# Patient Record
Sex: MALE | Race: WHITE | NOT HISPANIC OR LATINO | Employment: FULL TIME | URBAN - NONMETROPOLITAN AREA
[De-identification: names, ages, dates, MRNs, and addresses within clinical notes are randomized per-mention and may not be internally consistent; named-entity substitution may affect disease eponyms.]

---

## 2022-06-27 ENCOUNTER — OFFICE VISIT (OUTPATIENT)
Dept: INTERNAL MEDICINE | Facility: CLINIC | Age: 37
End: 2022-06-27

## 2022-06-27 VITALS
SYSTOLIC BLOOD PRESSURE: 147 MMHG | DIASTOLIC BLOOD PRESSURE: 88 MMHG | HEIGHT: 74 IN | TEMPERATURE: 98 F | RESPIRATION RATE: 16 BRPM | WEIGHT: 273 LBS | OXYGEN SATURATION: 99 % | BODY MASS INDEX: 35.04 KG/M2 | HEART RATE: 85 BPM

## 2022-06-27 DIAGNOSIS — Z13.1 SCREENING FOR DIABETES MELLITUS (DM): ICD-10-CM

## 2022-06-27 DIAGNOSIS — Z13.29 SCREENING FOR THYROID DISORDER: ICD-10-CM

## 2022-06-27 DIAGNOSIS — R03.0 ELEVATED BLOOD PRESSURE READING WITHOUT DIAGNOSIS OF HYPERTENSION: ICD-10-CM

## 2022-06-27 DIAGNOSIS — Z13.228 SCREENING FOR ENDOCRINE, METABOLIC AND IMMUNITY DISORDER: ICD-10-CM

## 2022-06-27 DIAGNOSIS — Z76.89 ENCOUNTER TO ESTABLISH CARE: ICD-10-CM

## 2022-06-27 DIAGNOSIS — K21.9 GASTROESOPHAGEAL REFLUX DISEASE WITHOUT ESOPHAGITIS: ICD-10-CM

## 2022-06-27 DIAGNOSIS — Z13.220 SCREENING FOR CHOLESTEROL LEVEL: ICD-10-CM

## 2022-06-27 DIAGNOSIS — E66.9 OBESITY (BMI 30.0-34.9): ICD-10-CM

## 2022-06-27 DIAGNOSIS — Z13.29 SCREENING FOR ENDOCRINE, METABOLIC AND IMMUNITY DISORDER: ICD-10-CM

## 2022-06-27 DIAGNOSIS — Z23 NEED FOR DIPHTHERIA-TETANUS-PERTUSSIS (TDAP) VACCINE: ICD-10-CM

## 2022-06-27 DIAGNOSIS — Z11.59 ENCOUNTER FOR HEPATITIS C SCREENING TEST FOR LOW RISK PATIENT: ICD-10-CM

## 2022-06-27 DIAGNOSIS — Z13.0 SCREENING FOR ENDOCRINE, METABOLIC AND IMMUNITY DISORDER: ICD-10-CM

## 2022-06-27 DIAGNOSIS — Z00.00 ANNUAL PHYSICAL EXAM: Primary | ICD-10-CM

## 2022-06-27 DIAGNOSIS — Z13.0 SCREENING FOR DEFICIENCY ANEMIA: ICD-10-CM

## 2022-06-27 PROBLEM — E66.811 OBESITY (BMI 30.0-34.9): Status: ACTIVE | Noted: 2022-06-27

## 2022-06-27 PROCEDURE — 99385 PREV VISIT NEW AGE 18-39: CPT | Performed by: NURSE PRACTITIONER

## 2022-06-27 RX ORDER — FAMOTIDINE 20 MG/1
20 TABLET, FILM COATED ORAL DAILY
Qty: 90 TABLET | Refills: 0 | Status: SHIPPED | OUTPATIENT
Start: 2022-06-27

## 2022-06-27 RX ORDER — AMOXICILLIN AND CLAVULANATE POTASSIUM 875; 125 MG/1; MG/1
1 TABLET, FILM COATED ORAL 2 TIMES DAILY
COMMUNITY
End: 2022-07-11

## 2022-06-27 NOTE — PROGRESS NOTES
Subjective   Anand Lui is a 37 y.o. male and is here for a comprehensive physical exam. The patient reports GERD.    HPI:    Health Habits:  Eye exam within last 2 years? Yes   Dental exam every 6 months? Yes   Exercise habits: 0/7, job includes moving and lifting so he obtains exercise through those means   Healthy diet?  Wife was diagnosed with gestational diabetes.  Patient and wife are doing more portion control, cutting back carbs and sodas  Do you take any herbs or supplements that were not prescribed by a doctor? no  Are you taking aspirin daily? No     History:  Date last PSA: NA  He reports No decrease in urinary stream,  No nocturia, No dribbling, No hesitancy.    Family history of prostate cancer: No  No Family history of colon cancer.      reports being sexually active and has had partner(s) who are female. He reports using the following method of birth control/protection: Condom.    The following portions of the patient's history were reviewed and updated as appropriate: He  has no past medical history on file.  He does not have any pertinent problems on file.  He  has no past surgical history on file.  His family history includes Hyperlipidemia in his father; Hypertension in his father.  He  reports that he has never smoked. He has never used smokeless tobacco. He reports current alcohol use of about 1.0 standard drink of alcohol per week. He reports that he does not use drugs.  Current Outpatient Medications   Medication Sig Dispense Refill   • amoxicillin-clavulanate (Augmentin) 875-125 MG per tablet Take 1 tablet by mouth 2 (Two) Times a Day.     • famotidine (Pepcid) 20 MG tablet Take 1 tablet by mouth Daily. 90 tablet 0     No current facility-administered medications for this visit.       Review of Systems    Review of Systems   Gastrointestinal: Positive for GERD and indigestion. Negative for abdominal pain, nausea and vomiting.   All other systems reviewed and are  "negative.        Objective   /88   Pulse 85   Temp 98 °F (36.7 °C) (Temporal)   Resp 16   Ht 188 cm (74\")   Wt 124 kg (273 lb)   SpO2 99%   BMI 35.05 kg/m²     Physical Exam  Vitals and nursing note reviewed.   Constitutional:       Appearance: Normal appearance. He is obese.   HENT:      Head: Normocephalic and atraumatic.      Right Ear: Tympanic membrane normal.      Left Ear: Tympanic membrane normal.      Nose: Nose normal.      Mouth/Throat:      Mouth: Mucous membranes are moist.      Pharynx: Oropharynx is clear.   Eyes:      Extraocular Movements: Extraocular movements intact.      Conjunctiva/sclera: Conjunctivae normal.      Pupils: Pupils are equal, round, and reactive to light.   Neck:      Thyroid: No thyromegaly.      Vascular: No carotid bruit.   Cardiovascular:      Rate and Rhythm: Normal rate and regular rhythm.      Pulses: Normal pulses.      Heart sounds: Normal heart sounds.   Pulmonary:      Effort: Pulmonary effort is normal.      Breath sounds: Normal breath sounds.   Abdominal:      General: Abdomen is flat. Bowel sounds are normal.      Palpations: Abdomen is soft.   Genitourinary:     Comments: defer  Musculoskeletal:         General: Normal range of motion.      Cervical back: Normal range of motion and neck supple.   Lymphadenopathy:      Cervical: No cervical adenopathy.   Skin:     General: Skin is warm and dry.      Capillary Refill: Capillary refill takes less than 2 seconds.   Neurological:      General: No focal deficit present.      Mental Status: He is alert and oriented to person, place, and time.      Cranial Nerves: No cranial nerve deficit.      Motor: No weakness.      Gait: Gait normal.   Psychiatric:         Mood and Affect: Mood normal.         Behavior: Behavior normal.         Thought Content: Thought content normal.         Health Maintenance   Topic Date Due   • TDAP/TD VACCINES (2 - Tdap) 06/27/2022 (Originally 7/25/2012)   • INFLUENZA VACCINE  " 10/01/2022        Assessment & Plan   Healthy male exam.     1.    Diagnosis Plan   1. Annual physical exam  Tdap Vaccine Greater Than or Equal To 8yo IM    CBC No Differential    Comprehensive Metabolic Panel    Hemoglobin A1c    Lipid Panel    TSH Rfx On Abnormal To Free T4    Hepatitis C antibody   2. Encounter to establish care  Tdap Vaccine Greater Than or Equal To 8yo IM    CBC No Differential    Comprehensive Metabolic Panel    Hemoglobin A1c    Lipid Panel    TSH Rfx On Abnormal To Free T4    Hepatitis C antibody   3. Elevated blood pressure reading without diagnosis of hypertension  First reading was 160/92 with a repeat of 147/88. Improved, but remains elevated/uncontrolled. Recommend DASH diet, 30 minutes of exercise at least 5 days a week or 150 minutes a week, and home blood pressure monitoring.  Keep a log of BP readings and bring it to next visit.  Bring in your blood pressure machine next visit to compare readings to ensure accuracy. F/u 1-2 weeks.    4. Gastroesophageal reflux disease  famotidine (Pepcid) 20 MG tablet  Uncontrolled. Anti-reflux measures, trigger food and drinks to avoid; Fatty foods, alcohol, chocolate, coffee, tea, caffeinated soft drinks (decaffeinated coffee still has some caffeine), peppermint and spearmint, spices and vinegar, citrus fruits and juices, tomatoes and tomato sauces. Other antireflux measures include weight reduction if overweight, avoid tight clothing around the abdomen, elevate the head of the bed 6 inches (may us a bed wedge which is placed between the mattress and box springs) or blocks under the heard of the bed. Don't drink or eat for 2 hours before going to bed and avoid lying down immediately after meals. Take medication as prescribed.    5. Need for diphtheria-tetanus-pertussis (Tdap) vaccine  Tdap Vaccine Greater Than or Equal To 8yo IM  Patient left office before Tdap was administered.  Will administer next visit.   6. Screening for endocrine, metabolic  and immunity disorder  Comprehensive Metabolic Panel   7. Encounter for hepatitis C screening test for low risk patient  Hepatitis C antibody   8. Screening for thyroid disorder  TSH Rfx On Abnormal To Free T4   9. Screening for diabetes mellitus (DM)  Hemoglobin A1c   10. Screening for deficiency anemia  CBC No Differential   11.    12.   Screening for cholesterol level     Obesity (BMI 30.0-34.9) Lipid Panel    Comprehensive Metabolic Panel   Hemoglobin A1c   Lipid Panel   TSH Rfx On Abnormal To Free T4   Class 2 Severe Obesity (BMI >=35 and <=39.9). Obesity-related health conditions include the following: obstructive sleep apnea, hypertension, diabetes mellitus, dyslipidemias and GERD. Obesity is newly identified. BMI is is above average; BMI management plan is completed. We discussed portion control and increasing exercise.       2. Patient Counseling:  -Health maintenance information provided and discussed  -Counseled on age-appropriate health screenings  -Immunizations for age discussed, encouraged.  -Encouraged 30 minutes of exercise 5 days a week, or 150 minutes total per week.  -Recommend healthy diet choices and portion control   -Discussed importance of regular dental visits and cleanings every 6 months.  -Discussed importance of regular eye exams    3. Discussed the patient's BMI with him.  The BMI is above average; BMI management plan is completed  4. Return in about 2 weeks (around 7/11/2022) for elevated bp, heartburn .         Snow Garcia, APRN  06/27/2022  17:10 EDT

## 2022-07-02 ENCOUNTER — LAB (OUTPATIENT)
Dept: LAB | Facility: HOSPITAL | Age: 37
End: 2022-07-02

## 2022-07-02 LAB
ALBUMIN SERPL-MCNC: 4.4 G/DL (ref 3.5–5.2)
ALBUMIN/GLOB SERPL: 1.8 G/DL
ALP SERPL-CCNC: 60 U/L (ref 39–117)
ALT SERPL W P-5'-P-CCNC: 34 U/L (ref 1–41)
ANION GAP SERPL CALCULATED.3IONS-SCNC: 14 MMOL/L (ref 5–15)
AST SERPL-CCNC: 20 U/L (ref 1–40)
BILIRUB SERPL-MCNC: 0.6 MG/DL (ref 0–1.2)
BUN SERPL-MCNC: 12 MG/DL (ref 6–20)
BUN/CREAT SERPL: 14.1 (ref 7–25)
CALCIUM SPEC-SCNC: 9.2 MG/DL (ref 8.6–10.5)
CHLORIDE SERPL-SCNC: 104 MMOL/L (ref 98–107)
CHOLEST SERPL-MCNC: 159 MG/DL (ref 0–200)
CO2 SERPL-SCNC: 23 MMOL/L (ref 22–29)
CREAT SERPL-MCNC: 0.85 MG/DL (ref 0.76–1.27)
DEPRECATED RDW RBC AUTO: 39.9 FL (ref 37–54)
EGFRCR SERPLBLD CKD-EPI 2021: 116.2 ML/MIN/1.73
ERYTHROCYTE [DISTWIDTH] IN BLOOD BY AUTOMATED COUNT: 12.8 % (ref 12.3–15.4)
GLOBULIN UR ELPH-MCNC: 2.5 GM/DL
GLUCOSE SERPL-MCNC: 96 MG/DL (ref 65–99)
HBA1C MFR BLD: 5.5 % (ref 4.8–5.6)
HCT VFR BLD AUTO: 44.7 % (ref 37.5–51)
HCV AB SER DONR QL: NORMAL
HDLC SERPL-MCNC: 43 MG/DL (ref 40–60)
HGB BLD-MCNC: 15 G/DL (ref 13–17.7)
LDLC SERPL CALC-MCNC: 91 MG/DL (ref 0–100)
LDLC/HDLC SERPL: 2.02 {RATIO}
MCH RBC QN AUTO: 29.2 PG (ref 26.6–33)
MCHC RBC AUTO-ENTMCNC: 33.6 G/DL (ref 31.5–35.7)
MCV RBC AUTO: 87.1 FL (ref 79–97)
PLATELET # BLD AUTO: 189 10*3/MM3 (ref 140–450)
PMV BLD AUTO: 12.2 FL (ref 6–12)
POTASSIUM SERPL-SCNC: 4.4 MMOL/L (ref 3.5–5.2)
PROT SERPL-MCNC: 6.9 G/DL (ref 6–8.5)
RBC # BLD AUTO: 5.13 10*6/MM3 (ref 4.14–5.8)
SODIUM SERPL-SCNC: 141 MMOL/L (ref 136–145)
TRIGL SERPL-MCNC: 145 MG/DL (ref 0–150)
TSH SERPL DL<=0.05 MIU/L-ACNC: 2.43 UIU/ML (ref 0.27–4.2)
VLDLC SERPL-MCNC: 25 MG/DL (ref 5–40)
WBC NRBC COR # BLD: 8.92 10*3/MM3 (ref 3.4–10.8)

## 2022-07-02 PROCEDURE — 36415 COLL VENOUS BLD VENIPUNCTURE: CPT | Performed by: NURSE PRACTITIONER

## 2022-07-02 PROCEDURE — 83036 HEMOGLOBIN GLYCOSYLATED A1C: CPT | Performed by: NURSE PRACTITIONER

## 2022-07-02 PROCEDURE — 80061 LIPID PANEL: CPT | Performed by: NURSE PRACTITIONER

## 2022-07-02 PROCEDURE — 86803 HEPATITIS C AB TEST: CPT | Performed by: NURSE PRACTITIONER

## 2022-07-02 PROCEDURE — 80050 GENERAL HEALTH PANEL: CPT | Performed by: NURSE PRACTITIONER

## 2022-07-11 ENCOUNTER — OFFICE VISIT (OUTPATIENT)
Dept: INTERNAL MEDICINE | Facility: CLINIC | Age: 37
End: 2022-07-11

## 2022-07-11 VITALS
SYSTOLIC BLOOD PRESSURE: 160 MMHG | WEIGHT: 274 LBS | HEART RATE: 69 BPM | TEMPERATURE: 96.8 F | OXYGEN SATURATION: 99 % | HEIGHT: 74 IN | DIASTOLIC BLOOD PRESSURE: 90 MMHG | BODY MASS INDEX: 35.16 KG/M2

## 2022-07-11 DIAGNOSIS — I10 PRIMARY HYPERTENSION: Primary | ICD-10-CM

## 2022-07-11 DIAGNOSIS — S39.012A STRAIN OF LUMBAR REGION, INITIAL ENCOUNTER: ICD-10-CM

## 2022-07-11 PROCEDURE — 99214 OFFICE O/P EST MOD 30 MIN: CPT | Performed by: NURSE PRACTITIONER

## 2022-07-11 RX ORDER — LISINOPRIL 10 MG/1
10 TABLET ORAL DAILY
Qty: 30 TABLET | Refills: 0 | Status: SHIPPED | OUTPATIENT
Start: 2022-07-11 | End: 2022-08-09 | Stop reason: SDUPTHER

## 2022-07-11 RX ORDER — CYCLOBENZAPRINE HCL 10 MG
10 TABLET ORAL 3 TIMES DAILY PRN
Qty: 21 TABLET | Refills: 0 | Status: SHIPPED | OUTPATIENT
Start: 2022-07-11 | End: 2022-09-06

## 2022-07-11 NOTE — PROGRESS NOTES
"  Office Visit      Patient Name: Anand Lui  : 1985   MRN: [id number]   Care Team: Patient Care Team:  Snow Garcia APRN as PCP - General (Family Medicine)    Chief Complaint  Follow-up (Lower back pain since last week. )    Subjective     Subjective      Anand Lui presents to [facility] for follow-up of hypertension and new onset back pain  Home blood pressure readings averaging ~148/88 daily consistent.  Denies chest pain, shortness of breath, headache, dizziness, and excess fatigue.   Brought machine in so we can check accuracy.  Trying to follow DASH diet and has been working on exercising.    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  Complains the left lower side of his back hurts when he stretches it out and with movement x 1 week.  States it is very mild and he would not have came for the doctor for this, but since he was here he thought he would mention it because he is unsure what medications he can take with high blood pressure.  He has not been taking anything for the pain, not been trying any heat or stretches.  States it is not getting worse, but not getting any better.  Pain does not refer, and is a 4 out of 10 currently.    Review of Systems   Constitutional: Negative.    Eyes: Negative.    Respiratory: Negative.    Cardiovascular: Negative.    Gastrointestinal: Negative.    Musculoskeletal: Positive for back pain and myalgias.   Neurological: Negative.    All other systems reviewed and are negative.      Objective     Objective   Vital Signs:   /90   Pulse 69   Temp 96.8 °F (36 °C)   Ht 188 cm (74\")   Wt 124 kg (274 lb)   SpO2 99%   BMI 35.18 kg/m²     Physical Exam  Vitals and nursing note reviewed.   Constitutional:       General: He is not in acute distress.     Appearance: Normal appearance. He is obese. He is not ill-appearing, toxic-appearing or diaphoretic. "   HENT:      Head: Normocephalic and atraumatic.   Eyes:      Extraocular Movements: Extraocular movements intact.      Pupils: Pupils are equal, round, and reactive to light.   Neck:      Vascular: No carotid bruit or JVD.   Cardiovascular:      Rate and Rhythm: Normal rate and regular rhythm.   Pulmonary:      Effort: Pulmonary effort is normal.      Breath sounds: Normal breath sounds.   Musculoskeletal:         General: Normal range of motion.      Cervical back: Normal range of motion and neck supple.      Lumbar back: Spasms and tenderness present. No swelling or deformity. Normal range of motion. Negative right straight leg raise test and negative left straight leg raise test.      Right lower leg: No edema.      Left lower leg: No edema.   Skin:     General: Skin is warm and dry.   Neurological:      General: No focal deficit present.      Mental Status: He is alert and oriented to person, place, and time.   Psychiatric:         Mood and Affect: Mood normal.         Behavior: Behavior normal.         Thought Content: Thought content normal.         Judgment: Judgment normal.          Assessment / Plan      Assessment & Plan   Problem List Items Addressed This Visit    None     Visit Diagnoses     Primary hypertension    -  Primary    Relevant Medications    Uncontrolled. Star lisinopril (PRINIVIL,ZESTRIL) 10 MG tablet daily. Discussed side effects. Recommend DASH diet, moderate-intensity exercises 4-5 times/week, medication compliance, and home blood pressure monitoring. Goal is <130/80. F/u 4 weeks or sooner for recheck. BP machine checked and accurate compare to manual.     Strain of lumbar region, initial encounter        Relevant Medications    cyclobenzaprine (FLEXERIL) 10 MG tablet  Patient was counseled regarding the use of applying heat for 15-20 minute intervals, OTC creams for pain relief, and the benefits of Physical therapy and exercise/stretches. Discussed proper methods of bending, lifting with  the knees. Patient was prescribed muscle relaxer for short term management of symptoms. Try to avoid NSAIDs because this can elevate blood pressure.  Recommend conservative measures and Tylenol as needed.  Follow up if symptoms persist/worsen.        Labs from previous visit reviewed with patient. Answered any and all questions patient had.        Follow Up   Return in about 4 weeks (around 8/8/2022) for BP/Back pain.  Patient was given instructions and counseling regarding his condition or for health maintenance advice. Please see specific information pulled into the AVS if appropriate.     IRINEO Ram  [facility]

## 2022-08-09 ENCOUNTER — OFFICE VISIT (OUTPATIENT)
Dept: INTERNAL MEDICINE | Facility: CLINIC | Age: 37
End: 2022-08-09

## 2022-08-09 DIAGNOSIS — S39.012D STRAIN OF LUMBAR REGION, SUBSEQUENT ENCOUNTER: ICD-10-CM

## 2022-08-09 DIAGNOSIS — I10 PRIMARY HYPERTENSION: Primary | ICD-10-CM

## 2022-08-09 PROCEDURE — 99214 OFFICE O/P EST MOD 30 MIN: CPT | Performed by: NURSE PRACTITIONER

## 2022-08-09 RX ORDER — LISINOPRIL 10 MG/1
10 TABLET ORAL DAILY
Qty: 90 TABLET | Refills: 1 | Status: SHIPPED | OUTPATIENT
Start: 2022-08-09 | End: 2022-08-09

## 2022-08-09 RX ORDER — LISINOPRIL 10 MG/1
10 TABLET ORAL DAILY
Qty: 90 TABLET | Refills: 1 | Status: SHIPPED | OUTPATIENT
Start: 2022-08-09 | End: 2023-02-06

## 2022-08-09 RX ORDER — AMOXICILLIN AND CLAVULANATE POTASSIUM 875; 125 MG/1; MG/1
TABLET, FILM COATED ORAL
COMMUNITY
Start: 2022-07-19 | End: 2022-08-09

## 2022-08-10 VITALS
SYSTOLIC BLOOD PRESSURE: 148 MMHG | HEIGHT: 74 IN | TEMPERATURE: 97.3 F | RESPIRATION RATE: 16 BRPM | OXYGEN SATURATION: 99 % | HEART RATE: 72 BPM | BODY MASS INDEX: 34.78 KG/M2 | WEIGHT: 271 LBS | DIASTOLIC BLOOD PRESSURE: 88 MMHG

## 2022-08-10 NOTE — PROGRESS NOTES
"  Office Visit      Patient Name: Anand Lui  : 1985   MRN: [id number]   Care Team: Patient Care Team:  Snow Garcia APRN as PCP - General (Family Medicine)    Chief Complaint  Hypertension (4 week follow up, pt states that he misplaced his medication while he had Covid, requesting refill)    Subjective     Subjective      Anand Lui presents to [facility] for follow-up of hypertension.   Started on Lisinopril 10 mg last visit. He was taking the medication as prescribed x 1 week and states he lost the bottle. He has not been taking it for 3 weeks. When the medication was initiated and during that first week, his BP was averaging 120/70s with home readings. He ended up getting diagnosed with COVID-19 and with the stress of this he forgot his medication and lost it.  Denies chest pain, shortness of breath, headache, dizziness, and excess fatigue.   Trying to follow DASH diet and being active.    Reports back pain is much improved from last visit.     Review of Systems   Constitutional: Negative.    Eyes: Negative.    Respiratory: Negative.    Cardiovascular: Negative.    Gastrointestinal: Negative.    Musculoskeletal: Negative for back pain.   Neurological: Negative.    All other systems reviewed and are negative.      Objective     Objective   Vital Signs:   /88   Pulse 72   Temp 97.3 °F (36.3 °C) (Temporal)   Resp 16   Ht 188 cm (74\")   Wt 123 kg (271 lb)   SpO2 99%   BMI 34.79 kg/m²     Physical Exam  Vitals and nursing note reviewed.   Constitutional:       Appearance: Normal appearance. He is obese.   HENT:      Head: Normocephalic and atraumatic.   Eyes:      Extraocular Movements: Extraocular movements intact.      Pupils: Pupils are equal, round, and reactive to light.   Neck:      Thyroid: No thyromegaly.   Cardiovascular:      Rate and Rhythm: Normal rate and regular rhythm.      Heart sounds: Normal heart sounds.   Pulmonary:      Effort: " Pulmonary effort is normal.      Breath sounds: Normal breath sounds.   Musculoskeletal:         General: Normal range of motion.      Cervical back: Normal range of motion and neck supple.   Neurological:      General: No focal deficit present.      Mental Status: He is alert and oriented to person, place, and time.   Psychiatric:         Mood and Affect: Mood normal.         Behavior: Behavior normal.         Thought Content: Thought content normal.         Judgment: Judgment normal.          Assessment / Plan      Assessment & Plan   Problem List Items Addressed This Visit    None     Visit Diagnoses     Primary hypertension    -  Primary    Relevant Medications    lisinopril (PRINIVIL,ZESTRIL) 10 MG tablet    Strain of lumbar region, subsequent encounter                Refilled Lisinopril 10 mg. Discussed medication compliance and importance of this. Recommend DASH diet, moderate-intensity exercises 4-5 times/week,, and home blood pressure monitoring. Home BP monitor was checked last visit and accurate. F/u in additional 2-4 weeks with BP readings so we can ensure more normalized levels. Goal is <130/80.   Strain of lumbar region is now resolved. Only had to take 1 dose of the muscle relaxer. No issues.         Follow Up   Return in about 4 weeks (around 9/6/2022) for bp.  Patient was given instructions and counseling regarding his condition or for health maintenance advice. Please see specific information pulled into the AVS if appropriate.     IRINEO Ram  [facility] - [place]

## 2022-09-06 ENCOUNTER — OFFICE VISIT (OUTPATIENT)
Dept: INTERNAL MEDICINE | Facility: CLINIC | Age: 37
End: 2022-09-06

## 2022-09-06 VITALS
BODY MASS INDEX: 34.52 KG/M2 | SYSTOLIC BLOOD PRESSURE: 122 MMHG | RESPIRATION RATE: 16 BRPM | OXYGEN SATURATION: 97 % | HEIGHT: 74 IN | DIASTOLIC BLOOD PRESSURE: 70 MMHG | WEIGHT: 269 LBS | HEART RATE: 83 BPM | TEMPERATURE: 98 F

## 2022-09-06 DIAGNOSIS — I10 PRIMARY HYPERTENSION: Primary | ICD-10-CM

## 2022-09-06 PROCEDURE — 99214 OFFICE O/P EST MOD 30 MIN: CPT | Performed by: NURSE PRACTITIONER

## 2022-09-06 RX ORDER — IBUPROFEN 800 MG/1
TABLET ORAL
COMMUNITY
Start: 2022-08-22

## 2022-09-06 RX ORDER — HYDROCODONE BITARTRATE AND ACETAMINOPHEN 5; 325 MG/1; MG/1
1 TABLET ORAL EVERY 6 HOURS PRN
COMMUNITY
Start: 2022-09-01

## 2022-09-06 RX ORDER — AMOXICILLIN AND CLAVULANATE POTASSIUM 875; 125 MG/1; MG/1
TABLET, FILM COATED ORAL
COMMUNITY
Start: 2022-09-01 | End: 2023-03-13

## 2022-09-06 NOTE — PROGRESS NOTES
"  Office Visit      Patient Name: Anand Lui  : 1985   MRN: [id number]   Care Team: Patient Care Team:  Snow Braxton APRN as PCP - General (Family Medicine)    Chief Complaint  Hypertension (Follow up)    Subjective     Subjective      Anand Lui presents to [facility] for follow-up of hypertension.   Taking Lisinopril 10 mg daily. He is taking the medication as prescribed and denies any adverse effects.   Denies chest pain, shortness of breath, headache, dizziness, and excess fatigue.   Trying to follow DASH diet and has been exercising.    Review of Systems   Constitutional: Negative for chills, fatigue and fever.   Eyes: Negative for visual disturbance.   Respiratory: Negative for chest tightness and shortness of breath.    Cardiovascular: Negative for chest pain, palpitations and leg swelling.   Gastrointestinal: Negative for nausea and vomiting.   Musculoskeletal: Negative for myalgias.   Neurological: Negative for dizziness, syncope, light-headedness and headache.       Objective     Objective   Vital Signs:   /70   Pulse 83   Temp 98 °F (36.7 °C) (Temporal)   Resp 16   Ht 188 cm (74\")   Wt 122 kg (269 lb)   SpO2 97%   BMI 34.54 kg/m²     Physical Exam  Vitals and nursing note reviewed.   Constitutional:       General: He is not in acute distress.     Appearance: Normal appearance. He is obese. He is not diaphoretic.   HENT:      Head: Normocephalic and atraumatic.   Eyes:      Extraocular Movements: Extraocular movements intact.      Pupils: Pupils are equal, round, and reactive to light.   Neck:      Vascular: No JVD.   Cardiovascular:      Rate and Rhythm: Normal rate and regular rhythm.   Pulmonary:      Effort: Pulmonary effort is normal.      Breath sounds: Normal breath sounds.   Musculoskeletal:         General: Normal range of motion.      Cervical back: Normal range of motion and neck supple.      Right lower leg: No edema.      Left " lower leg: No edema.   Skin:     General: Skin is warm and dry.      Capillary Refill: Capillary refill takes less than 2 seconds.   Neurological:      General: No focal deficit present.      Mental Status: He is alert and oriented to person, place, and time.   Psychiatric:         Mood and Affect: Mood normal.         Behavior: Behavior normal.         Thought Content: Thought content normal.          Assessment / Plan      Assessment & Plan   Problem List Items Addressed This Visit    None     Visit Diagnoses     Primary hypertension    -  Primary        Stable.   Continue current medications as prescribed.  Recommend DASH diet, moderate-intensity exercises 4-5 times/week, medication compliance, and home blood pressure monitoring.       Follow Up   Return in about 6 months (around 3/6/2023).  Patient was given instructions and counseling regarding his condition or for health maintenance advice. Please see specific information pulled into the AVS if appropriate.     IRINEO Krueger  [facility] - [place]

## 2023-02-05 DIAGNOSIS — I10 PRIMARY HYPERTENSION: ICD-10-CM

## 2023-02-06 RX ORDER — LISINOPRIL 10 MG/1
TABLET ORAL
Qty: 90 TABLET | Refills: 1 | Status: SHIPPED | OUTPATIENT
Start: 2023-02-06

## 2023-02-06 NOTE — TELEPHONE ENCOUNTER
Rx Refill Note  Requested Prescriptions     Pending Prescriptions Disp Refills   • lisinopril (PRINIVIL,ZESTRIL) 10 MG tablet [Pharmacy Med Name: LISINOPRIL 10 MG TABLET] 90 tablet 1     Sig: TAKE 1 TABLET BY MOUTH EVERY DAY      Last office visit with prescribing clinician: 9/6/2022   Last telemedicine visit with prescribing clinician:   Next office visit with prescribing clinician: 3/6/2023       Ida Gonzalez MA  02/06/23, 14:23 EST

## 2023-03-13 ENCOUNTER — OFFICE VISIT (OUTPATIENT)
Dept: INTERNAL MEDICINE | Facility: CLINIC | Age: 38
End: 2023-03-13
Payer: COMMERCIAL

## 2023-03-13 VITALS
DIASTOLIC BLOOD PRESSURE: 80 MMHG | SYSTOLIC BLOOD PRESSURE: 130 MMHG | TEMPERATURE: 98.1 F | HEIGHT: 74 IN | WEIGHT: 276.75 LBS | BODY MASS INDEX: 35.52 KG/M2 | HEART RATE: 68 BPM | OXYGEN SATURATION: 98 %

## 2023-03-13 DIAGNOSIS — K21.9 GASTROESOPHAGEAL REFLUX DISEASE WITHOUT ESOPHAGITIS: ICD-10-CM

## 2023-03-13 DIAGNOSIS — I10 PRIMARY HYPERTENSION: Primary | ICD-10-CM

## 2023-03-13 PROCEDURE — 96127 BRIEF EMOTIONAL/BEHAV ASSMT: CPT | Performed by: NURSE PRACTITIONER

## 2023-03-13 PROCEDURE — 99214 OFFICE O/P EST MOD 30 MIN: CPT | Performed by: NURSE PRACTITIONER

## 2023-03-13 RX ORDER — BENZONATATE 100 MG/1
CAPSULE ORAL
COMMUNITY
Start: 2022-12-12

## 2023-03-13 NOTE — PROGRESS NOTES
"  Office Visit      Patient Name: Anand Lui  : 1985   MRN: [id number]   Care Team: Patient Care Team:  Snow Braxton APRN as PCP - General (Family Medicine)    Chief Complaint  Follow-up (6 month follow up, HTN/)    Subjective     Subjective      Anand Lui presents to [facility] for follow-up of hypertension.   Taking lisinopril 10 mg as directed.  No adverse effects.  Not monitoring his blood pressure at home since last readings were normal at last visit in September  Denies chest pain, shortness of breath, headache, dizziness, and excess fatigue.   Trying to follow DASH diet and staying active    Reflux is stable on the famotidine as long as he watches his diet.    PHQ-9: Brief Depression Severity Measure Score: 0  Denies depression symptoms.       Objective     Objective   Vital Signs:   /80 (BP Location: Left arm, Patient Position: Sitting, Cuff Size: Adult)   Pulse 68   Temp 98.1 °F (36.7 °C) (Temporal)   Ht 188 cm (74\")   Wt 126 kg (276 lb 12 oz)   SpO2 98%   BMI 35.53 kg/m²     Physical Exam  Vitals and nursing note reviewed.   Constitutional:       General: He is not in acute distress.     Appearance: Normal appearance. He is obese. He is not diaphoretic.   HENT:      Head: Normocephalic and atraumatic.      Right Ear: External ear normal.      Left Ear: External ear normal.      Nose: Nose normal.      Mouth/Throat:      Mouth: Mucous membranes are moist.   Eyes:      Extraocular Movements: Extraocular movements intact.      Pupils: Pupils are equal, round, and reactive to light.   Neck:      Vascular: No JVD.   Cardiovascular:      Rate and Rhythm: Normal rate and regular rhythm.      Heart sounds: Normal heart sounds.   Pulmonary:      Effort: Pulmonary effort is normal.      Breath sounds: Normal breath sounds.   Musculoskeletal:         General: Normal range of motion.      Cervical back: Normal range of motion and neck supple.      Right " lower leg: No edema.      Left lower leg: No edema.   Skin:     General: Skin is warm and dry.      Capillary Refill: Capillary refill takes less than 2 seconds.   Neurological:      Mental Status: He is alert and oriented to person, place, and time.   Psychiatric:         Mood and Affect: Mood normal.         Behavior: Behavior normal.         Thought Content: Thought content normal.         Judgment: Judgment normal.          Assessment / Plan      Assessment & Plan   Problem List Items Addressed This Visit        Gastrointestinal Abdominal     Gastroesophageal reflux disease   Other Visit Diagnoses     Primary hypertension    -  Primary    Relevant Orders    CBC No Differential    Comprehensive Metabolic Panel        HTN stable. Continue current medications as prescribed. Recommend DASH diet, 150 minutes of exercise weekly, medication compliance, and home blood pressure monitoring.  Update CBC/CMP today.  Previous labs reviewed and no other concerns, lipid panel was normal.  GERD stable.  Continue famotidine, reflux measures and trigger foods discussed.      Follow Up   Return in about 3 months (around 6/27/2023) for Annual.  Patient was given instructions and counseling regarding his condition or for health maintenance advice. Please see specific information pulled into the AVS if appropriate.     IRINEO Krueger  [facility]

## 2024-03-25 ENCOUNTER — OFFICE VISIT (OUTPATIENT)
Dept: INTERNAL MEDICINE | Facility: CLINIC | Age: 39
End: 2024-03-25
Payer: COMMERCIAL

## 2024-03-25 VITALS
HEART RATE: 71 BPM | BODY MASS INDEX: 36.08 KG/M2 | TEMPERATURE: 98 F | DIASTOLIC BLOOD PRESSURE: 90 MMHG | OXYGEN SATURATION: 98 % | SYSTOLIC BLOOD PRESSURE: 146 MMHG | WEIGHT: 281 LBS

## 2024-03-25 DIAGNOSIS — R79.89 ELEVATED TSH: Primary | ICD-10-CM

## 2024-03-25 DIAGNOSIS — K08.89 PAIN, DENTAL: ICD-10-CM

## 2024-03-25 DIAGNOSIS — R05.1 ACUTE COUGH: ICD-10-CM

## 2024-03-25 DIAGNOSIS — I10 PRIMARY HYPERTENSION: Primary | ICD-10-CM

## 2024-03-25 LAB
ALBUMIN SERPL-MCNC: 4.4 G/DL (ref 3.5–5.2)
ALBUMIN/GLOB SERPL: 2 G/DL
ALP SERPL-CCNC: 66 U/L (ref 39–117)
ALT SERPL-CCNC: 45 U/L (ref 1–41)
AST SERPL-CCNC: 21 U/L (ref 1–40)
BILIRUB SERPL-MCNC: 0.4 MG/DL (ref 0–1.2)
BUN SERPL-MCNC: 13 MG/DL (ref 6–20)
BUN/CREAT SERPL: 10.3 (ref 7–25)
CALCIUM SERPL-MCNC: 9.1 MG/DL (ref 8.6–10.5)
CHLORIDE SERPL-SCNC: 103 MMOL/L (ref 98–107)
CHOLEST SERPL-MCNC: 135 MG/DL (ref 0–200)
CO2 SERPL-SCNC: 29.1 MMOL/L (ref 22–29)
CREAT SERPL-MCNC: 1.26 MG/DL (ref 0.76–1.27)
EGFRCR SERPLBLD CKD-EPI 2021: 75.8 ML/MIN/1.73
ERYTHROCYTE [DISTWIDTH] IN BLOOD BY AUTOMATED COUNT: 13 % (ref 12.3–15.4)
GLOBULIN SER CALC-MCNC: 2.2 GM/DL
GLUCOSE SERPL-MCNC: 76 MG/DL (ref 65–99)
HCT VFR BLD AUTO: 43.8 % (ref 37.5–51)
HDLC SERPL-MCNC: 54 MG/DL (ref 40–60)
HGB BLD-MCNC: 14.6 G/DL (ref 13–17.7)
LDLC SERPL CALC-MCNC: 54 MG/DL (ref 0–100)
MCH RBC QN AUTO: 29.3 PG (ref 26.6–33)
MCHC RBC AUTO-ENTMCNC: 33.3 G/DL (ref 31.5–35.7)
MCV RBC AUTO: 87.8 FL (ref 79–97)
PLATELET # BLD AUTO: 199 10*3/MM3 (ref 140–450)
POTASSIUM SERPL-SCNC: 4.7 MMOL/L (ref 3.5–5.2)
PROT SERPL-MCNC: 6.6 G/DL (ref 6–8.5)
RBC # BLD AUTO: 4.99 10*6/MM3 (ref 4.14–5.8)
SODIUM SERPL-SCNC: 142 MMOL/L (ref 136–145)
T4 FREE SERPL-MCNC: 1.19 NG/DL (ref 0.93–1.7)
TRIGL SERPL-MCNC: 163 MG/DL (ref 0–150)
TSH SERPL DL<=0.005 MIU/L-ACNC: 5.47 UIU/ML (ref 0.27–4.2)
VLDLC SERPL CALC-MCNC: 27 MG/DL (ref 5–40)
WBC # BLD AUTO: 12.47 10*3/MM3 (ref 3.4–10.8)

## 2024-03-25 PROCEDURE — 99214 OFFICE O/P EST MOD 30 MIN: CPT | Performed by: NURSE PRACTITIONER

## 2024-03-25 RX ORDER — LISINOPRIL 10 MG/1
10 TABLET ORAL DAILY
Qty: 90 TABLET | Refills: 1 | Status: SHIPPED | OUTPATIENT
Start: 2024-03-25

## 2024-03-25 RX ORDER — METHYLPREDNISOLONE 4 MG/1
TABLET ORAL
COMMUNITY
Start: 2024-03-22

## 2024-03-25 NOTE — PROGRESS NOTES
"    Office Visit      Date: 2024   Patient Name: Anand Lui  : 1985   MRN: [id number]     Chief Complaint:    Chief Complaint   Patient presents with    Cough     Pt has had cough x1 week but has mostly cleared up.     Abstract     Pt has taken blood pressure medicine but stopped x1 year ago. Pt went to urgent care x3 days ago and they said it was elevated.   Pt has tooth pain that started at 3am, dentist called in amoxicillin.        History of Present Illness: Anand Lui is a 38 y.o. male who presents for a follow-up.    Hypertension  He has not taken his blood pressure medication in over 1 year. He used to take lisinopril 10 mg daily. He went to urgent care 3 days ago, and his blood pressure was elevated. The patient stopped taking his blood pressure medication and stopped coming to his follow-ups because \"life got in the way\". When he took his medication, his blood pressure was stable. He denies any vision changes, chest pain, headache, or lower extremity edema.    Tooth pain  He had tooth pain that started around 3:00 AM, and his dentist prescribed him amoxicillin. He has a follow-up with his dentist regarding the tooth pain.     Cough  He has also had a cough for about 1 week, but this has mostly cleared up. Was treated with steroids by [facility].     Subjective      Review of Systems:   Pertinent ROS noted in HPI.     I have reviewed the patients family history, social history, past medical history, past surgical history and have updated it as appropriate.     Medications:     Current Outpatient Medications:     lisinopril (PRINIVIL,ZESTRIL) 10 MG tablet, Take 1 tablet by mouth Daily., Disp: 90 tablet, Rfl: 1    methylPREDNISolone (MEDROL) 4 MG dose pack, TAKE 6 TABLETS ON DAY 1 AS DIRECTED ON PACKAGE AND DECREASE BY 1 TAB EACH DAY FOR A TOTAL OF 6 DAYS, Disp: , Rfl:     Allergies:   No Known Allergies    Objective     Physical Exam  Physical Exam  Constitutional:       General: He is not in " acute distress.     Appearance: Normal appearance. He is obese.   HENT:      Head: Normocephalic and atraumatic.      Right Ear: Tympanic membrane, ear canal and external ear normal.      Left Ear: Tympanic membrane, ear canal and external ear normal.      Nose: Nose normal.      Mouth/Throat:      Mouth: Mucous membranes are moist.      Pharynx: Oropharynx is clear.   Eyes:      General: No scleral icterus.        Right eye: No discharge.         Left eye: No discharge.      Extraocular Movements: Extraocular movements intact.      Conjunctiva/sclera: Conjunctivae normal.      Pupils: Pupils are equal, round, and reactive to light.   Cardiovascular:      Rate and Rhythm: Normal rate and regular rhythm.      Heart sounds: Normal heart sounds.   Pulmonary:      Effort: Pulmonary effort is normal. No respiratory distress.      Breath sounds: Normal breath sounds. No wheezing or rhonchi.      Comments: Intermittent dry cough noted  Abdominal:      Tenderness: There is no abdominal tenderness.   Musculoskeletal:         General: Normal range of motion.      Cervical back: Normal range of motion and neck supple.      Right lower leg: No edema.      Left lower leg: No edema.   Lymphadenopathy:      Cervical: No cervical adenopathy.   Skin:     General: Skin is warm and dry.      Findings: No rash.   Neurological:      General: No focal deficit present.      Mental Status: He is alert and oriented to person, place, and time. Mental status is at baseline.   Psychiatric:         Mood and Affect: Mood normal.         Behavior: Behavior normal.         Thought Content: Thought content normal.         Vital Signs:   Vitals:    03/25/24 0945 03/25/24 1000   BP: 142/88 146/90   Pulse: 71    Temp: 98 °F (36.7 °C)    SpO2: 98%    Weight: 127 kg (281 lb)      Body mass index is 36.08 kg/m².  Class 2 Severe Obesity (BMI >=35 and <=39.9). Obesity-related health conditions include the following: obstructive sleep apnea, hypertension,  coronary heart disease, diabetes mellitus, dyslipidemias, and GERD. Obesity is worsening. BMI is is above average; BMI management plan is completed. We discussed Information on healthy weight added to patient's after visit summary.           Labs:   No visits with results within 1 Month(s) from this visit.   Latest known visit with results is:   Admission on 07/18/2023, Discharged on 07/18/2023   Component Date Value Ref Range Status    Rapid Strep A Screen 07/18/2023 Negative   Final    Internal Control 07/18/2023 Passed   Final    Lot Number 07/18/2023 [id number]   Final    Expiration Date 07/18/2023 9/30/2024   Final        Assessment / Plan      Assessment/Plan:   Problem List Items Addressed This Visit    None  Visit Diagnoses       Primary hypertension    -  Primary    Relevant Medications    lisinopril (PRINIVIL,ZESTRIL) 10 MG tablet    Other Relevant Orders    CBC (No Diff)    Comprehensive Metabolic Panel    Lipid Panel    TSH Rfx On Abnormal To Free T4    Acute cough        Pain, dental              HTN  Reinitiate lisinopril 10 mg, take daily at the same time. Follow DASH diet, exercise 150 minutes per week, periodically monitor blood pressure. It should trend down over the next 2 weeks, goal < 130/80 mmHg. Order fasting labs today. If into under goal, follow up in 2-4 weeks.     2.   Acute cough  Rresolving. Exam is normal. Follow up if cough persists or worsens.    3.   Dental pain  Continue amoxicillin prescribed by dentist. Follow up with dentist after completing the antibiotic.        Follow Up:   Return in about 6 months (around 9/25/2024) for Annual.    Snow CABELLO  [facility]    Transcribed from ambient dictation for IRINEO Bhardwaj by Betty Pringle.  03/25/24   10:49 EDT    I have personally performed the services described in this document as transcribed by the above individual, and it is both accurate and complete.

## 2024-03-26 NOTE — PROGRESS NOTES
White count is elevated which is to be expected since you have dental infection   ALT (liver enzyme) is mildly elevated   Triglycerides are elevated 163   TSH (thyroid stimulating hormone) is mildly elevated   Other labs normal     Ensure to eat healthy low fat diet and exercise 150 minutes per week to help bring down triglycerides and prevent fatty liver disease   Would like to recheck TSH in about 4-6 weeks once your infection has resolved. I have put in future labs. Put on your calendar to come in and repeat TSH in 4-6 weeks, no appointment needed just walk into lab

## 2024-09-14 DIAGNOSIS — I10 PRIMARY HYPERTENSION: ICD-10-CM

## 2024-09-14 RX ORDER — LISINOPRIL 10 MG/1
10 TABLET ORAL DAILY
Qty: 90 TABLET | Refills: 1 | Status: SHIPPED | OUTPATIENT
Start: 2024-09-14

## 2024-09-14 NOTE — TELEPHONE ENCOUNTER
Rx Refill Note  Requested Prescriptions     Pending Prescriptions Disp Refills    lisinopril (PRINIVIL,ZESTRIL) 10 MG tablet [Pharmacy Med Name: LISINOPRIL 10 MG TABLET] 90 tablet 1     Sig: TAKE 1 TABLET BY MOUTH EVERY DAY      Last office visit with prescribing clinician: 3/25/2024   Last telemedicine visit with prescribing clinician: Visit date not found   Next office visit with prescribing clinician: 9/26/2024                         Would you like a call back once the refill request has been completed: [] Yes [] No    If the office needs to give you a call back, can they leave a voicemail: [] Yes [] No    Cathy Ngo MA  09/14/24, 12:01 EDT

## 2024-09-26 ENCOUNTER — OFFICE VISIT (OUTPATIENT)
Dept: INTERNAL MEDICINE | Facility: CLINIC | Age: 39
End: 2024-09-26
Payer: COMMERCIAL

## 2024-09-26 VITALS
HEART RATE: 71 BPM | HEIGHT: 74 IN | TEMPERATURE: 98.1 F | DIASTOLIC BLOOD PRESSURE: 78 MMHG | OXYGEN SATURATION: 97 % | SYSTOLIC BLOOD PRESSURE: 120 MMHG | BODY MASS INDEX: 36.19 KG/M2 | WEIGHT: 282 LBS

## 2024-09-26 DIAGNOSIS — E66.9 OBESITY (BMI 30.0-34.9): ICD-10-CM

## 2024-09-26 DIAGNOSIS — Z13.0 SCREENING FOR ENDOCRINE, METABOLIC AND IMMUNITY DISORDER: ICD-10-CM

## 2024-09-26 DIAGNOSIS — R79.89 ELEVATED TSH: ICD-10-CM

## 2024-09-26 DIAGNOSIS — Z23 NEED FOR TDAP VACCINATION: ICD-10-CM

## 2024-09-26 DIAGNOSIS — Z13.29 SCREENING FOR ENDOCRINE, METABOLIC AND IMMUNITY DISORDER: ICD-10-CM

## 2024-09-26 DIAGNOSIS — Z00.00 ANNUAL PHYSICAL EXAM: Primary | ICD-10-CM

## 2024-09-26 DIAGNOSIS — I10 PRIMARY HYPERTENSION: ICD-10-CM

## 2024-09-26 DIAGNOSIS — Z13.228 SCREENING FOR ENDOCRINE, METABOLIC AND IMMUNITY DISORDER: ICD-10-CM

## 2024-09-26 LAB
ALBUMIN SERPL-MCNC: 4.3 G/DL (ref 3.5–5.2)
ALBUMIN/GLOB SERPL: 2 G/DL
ALP SERPL-CCNC: 62 U/L (ref 39–117)
ALT SERPL-CCNC: 38 U/L (ref 1–41)
AST SERPL-CCNC: 20 U/L (ref 1–40)
BILIRUB SERPL-MCNC: 0.3 MG/DL (ref 0–1.2)
BUN SERPL-MCNC: 8 MG/DL (ref 6–20)
BUN/CREAT SERPL: 8.5 (ref 7–25)
CALCIUM SERPL-MCNC: 9 MG/DL (ref 8.6–10.5)
CHLORIDE SERPL-SCNC: 105 MMOL/L (ref 98–107)
CO2 SERPL-SCNC: 26.8 MMOL/L (ref 22–29)
CREAT SERPL-MCNC: 0.94 MG/DL (ref 0.76–1.27)
EGFRCR SERPLBLD CKD-EPI 2021: 107.1 ML/MIN/1.73
ERYTHROCYTE [DISTWIDTH] IN BLOOD BY AUTOMATED COUNT: 12.4 % (ref 12.3–15.4)
GLOBULIN SER CALC-MCNC: 2.2 GM/DL
GLUCOSE SERPL-MCNC: 95 MG/DL (ref 65–99)
HCT VFR BLD AUTO: 44.1 % (ref 37.5–51)
HGB BLD-MCNC: 14.7 G/DL (ref 13–17.7)
MCH RBC QN AUTO: 29.2 PG (ref 26.6–33)
MCHC RBC AUTO-ENTMCNC: 33.3 G/DL (ref 31.5–35.7)
MCV RBC AUTO: 87.7 FL (ref 79–97)
PLATELET # BLD AUTO: 189 10*3/MM3 (ref 140–450)
POTASSIUM SERPL-SCNC: 5.1 MMOL/L (ref 3.5–5.2)
PROT SERPL-MCNC: 6.5 G/DL (ref 6–8.5)
RBC # BLD AUTO: 5.03 10*6/MM3 (ref 4.14–5.8)
SODIUM SERPL-SCNC: 140 MMOL/L (ref 136–145)
TSH SERPL DL<=0.005 MIU/L-ACNC: 1.7 UIU/ML (ref 0.27–4.2)
WBC # BLD AUTO: 8.46 10*3/MM3 (ref 3.4–10.8)

## 2024-09-26 PROCEDURE — 99395 PREV VISIT EST AGE 18-39: CPT | Performed by: NURSE PRACTITIONER

## 2024-09-26 PROCEDURE — 90471 IMMUNIZATION ADMIN: CPT | Performed by: NURSE PRACTITIONER

## 2024-09-26 PROCEDURE — 90715 TDAP VACCINE 7 YRS/> IM: CPT | Performed by: NURSE PRACTITIONER

## 2025-03-10 ENCOUNTER — OFFICE VISIT (OUTPATIENT)
Dept: INTERNAL MEDICINE | Facility: CLINIC | Age: 40
End: 2025-03-10
Payer: COMMERCIAL

## 2025-03-10 VITALS
HEART RATE: 105 BPM | HEIGHT: 74 IN | TEMPERATURE: 98 F | BODY MASS INDEX: 36.45 KG/M2 | SYSTOLIC BLOOD PRESSURE: 128 MMHG | WEIGHT: 284 LBS | OXYGEN SATURATION: 100 % | DIASTOLIC BLOOD PRESSURE: 86 MMHG

## 2025-03-10 DIAGNOSIS — K21.9 GASTROESOPHAGEAL REFLUX DISEASE WITHOUT ESOPHAGITIS: Primary | ICD-10-CM

## 2025-03-10 PROCEDURE — 99213 OFFICE O/P EST LOW 20 MIN: CPT | Performed by: FAMILY MEDICINE

## 2025-03-10 NOTE — PROGRESS NOTES
Anand Lui is a 39 y.o. male.    Chief Complaint   Patient presents with    Diarrhea    Bloated     When he burps it tastes sour    Heartburn     Thinks he may be having a lot of acid reflux. Mostly first thing in the morning, he feels a cough and then will have stomach bile come up       HPI   History of Present Illness  The patient presents with gastrointestinal upset.    He has a persistent cough attributed to acid reflux, worse in the mornings, leading to vomiting. Recently, he had severe coughing with abdominal discomfort and vomiting bile. This morning, he expectorated clear phlegm after coughing. Over the past two days, he noticed a sour taste when burping. He reports constant bloating and reflux without early satiety, abdominal pain, or shortness of breath. He has not taken omeprazole for 48 hours and reports no fever. His typical eating pattern includes a morning snack, a meal around 2:00 PM, and another meal around 5:00 or 6:00 PM. Hunger causes nausea. The cough worsens with insufficient sleep, cold weather, and hot showers. Omeprazole previously provided relief.    Supplemental Information  He has been on lisinopril for approximately one year. Initial coughing began prior to lisinopril use.         The following portions of the patient's history were reviewed and updated as appropriate: allergies, current medications, past family history, past medical history, past social history, past surgical history and problem list.     No Known Allergies      Current Outpatient Medications:     lisinopril (PRINIVIL,ZESTRIL) 10 MG tablet, TAKE 1 TABLET BY MOUTH EVERY DAY, Disp: 90 tablet, Rfl: 1    ROS    Review of Systems   Respiratory:  Positive for cough. Negative for shortness of breath.    Cardiovascular:  Negative for chest pain.   Gastrointestinal:  Positive for nausea, vomiting and GERD. Negative for abdominal pain and diarrhea.       Vitals:    03/10/25 1456   BP: 128/86   Pulse: 105   Temp: 98 °F (36.7  "°C)   SpO2: 100%   Weight: 129 kg (284 lb)   Height: 188 cm (74.02\")   PainSc: 1          Physical Exam     Physical Exam  Constitutional:       General: He is not in acute distress.     Appearance: Normal appearance. He is well-developed.   HENT:      Head: Normocephalic and atraumatic.      Right Ear: External ear normal.      Left Ear: External ear normal.   Eyes:      Extraocular Movements: Extraocular movements intact.      Conjunctiva/sclera: Conjunctivae normal.   Cardiovascular:      Rate and Rhythm: Normal rate and regular rhythm.      Heart sounds: No murmur heard.  Pulmonary:      Effort: Pulmonary effort is normal. No respiratory distress.      Breath sounds: Normal breath sounds. No wheezing.   Abdominal:      General: Bowel sounds are normal. There is no distension.      Palpations: Abdomen is soft.      Tenderness: There is no abdominal tenderness.   Musculoskeletal:      Right lower leg: No edema.      Left lower leg: No edema.   Skin:     General: Skin is warm and dry.   Neurological:      Mental Status: He is alert and oriented to person, place, and time.      Cranial Nerves: No cranial nerve deficit.   Psychiatric:         Mood and Affect: Mood normal.         Behavior: Behavior normal.           Diagnoses and all orders for this visit:    1. Gastroesophageal reflux disease (Primary)  -     Ambulatory Referral to Gastroenterology  -     H. Pylori Antigen, Stool - Stool, Per Rectum  -     US Gallbladder        Assessment & Plan  1. Gastroesophageal reflux disease.  Clinical presentation suggests potential gastritis, necessitating gastroenterology referral and possible upper endoscopy. Differential diagnoses include gallbladder pathology (ultrasound) and H. pylori infection (stool study). Initiate gastroenterology referral, order H. pylori stool test and gallbladder ultrasound. Advise regular Prilosec (omeprazole) administration.    No orders of the defined types were placed in this " encounter.      No orders of the defined types were placed in this encounter.      Return if symptoms worsen or fail to improve.    Natalia Wilson, DO

## 2025-04-04 ENCOUNTER — OFFICE VISIT (OUTPATIENT)
Dept: INTERNAL MEDICINE | Facility: CLINIC | Age: 40
End: 2025-04-04
Payer: COMMERCIAL

## 2025-04-04 VITALS
HEART RATE: 65 BPM | BODY MASS INDEX: 36.57 KG/M2 | HEIGHT: 74 IN | SYSTOLIC BLOOD PRESSURE: 125 MMHG | RESPIRATION RATE: 20 BRPM | DIASTOLIC BLOOD PRESSURE: 79 MMHG | TEMPERATURE: 97.7 F | OXYGEN SATURATION: 100 % | WEIGHT: 285 LBS

## 2025-04-04 DIAGNOSIS — E66.811 OBESITY (BMI 30.0-34.9): ICD-10-CM

## 2025-04-04 DIAGNOSIS — I10 PRIMARY HYPERTENSION: Primary | ICD-10-CM

## 2025-04-04 DIAGNOSIS — K21.9 GASTROESOPHAGEAL REFLUX DISEASE WITHOUT ESOPHAGITIS: ICD-10-CM

## 2025-04-04 PROCEDURE — 99214 OFFICE O/P EST MOD 30 MIN: CPT | Performed by: NURSE PRACTITIONER

## 2025-04-04 RX ORDER — FAMOTIDINE 20 MG/1
20 TABLET, FILM COATED ORAL 2 TIMES DAILY
Qty: 60 TABLET | Refills: 0 | Status: SHIPPED | OUTPATIENT
Start: 2025-04-04

## 2025-04-04 NOTE — PROGRESS NOTES
Office Visit      Date: 2025   Patient Name: Anand Lui  : 1985   MRN: [id number]     Chief Complaint:    Chief Complaint   Patient presents with    Hypertension     6 month follow up     HPI:  HTN- stable on lisinopril 10 mg as directed.   Denies chest pain, shortness of breath, headache, dizziness, edema  Trying to follow mindful well balanced diet.   Exercise/diet: no purposeful exercise, trying to be mindful with diet  Recent issues with GERD; has upcoming gallbladder US pending next month, on PPI prn, has not taken any within the past 2 weeks, would prefer the H pylori breath test vs stool. His GI symptoms have improved since last visit with Dr. Wilson. Has GI appointment upcoming end of this month.   Vaccines and screenings reviewed     Subjective      Review of Systems:   Pertinent ROS noted in HPI.     I have reviewed the patients family history, social history, past medical history, past surgical history and have updated it as appropriate.     Medications:     Current Outpatient Medications:     lisinopril (PRINIVIL,ZESTRIL) 10 MG tablet, TAKE 1 TABLET BY MOUTH EVERY DAY, Disp: 90 tablet, Rfl: 1    famotidine (Pepcid) 20 MG tablet, Take 1 tablet by mouth 2 (Two) Times a Day., Disp: 60 tablet, Rfl: 0    Allergies:   No Known Allergies    Objective     Physical Exam  Constitutional:       General: He is not in acute distress.     Appearance: Normal appearance. He is obese.   HENT:      Head: Normocephalic and atraumatic.   Eyes:      General: No scleral icterus.     Extraocular Movements: Extraocular movements intact.      Pupils: Pupils are equal, round, and reactive to light.   Cardiovascular:      Rate and Rhythm: Normal rate and regular rhythm.      Heart sounds: Normal heart sounds.   Pulmonary:      Effort: Pulmonary effort is normal.      Breath sounds: Normal breath sounds.   Musculoskeletal:         General: Normal range of motion.      Cervical back: Normal range of motion.  "     Right lower leg: No edema.      Left lower leg: No edema.   Skin:     General: Skin is warm and dry.   Neurological:      General: No focal deficit present.      Mental Status: He is alert and oriented to person, place, and time.   Psychiatric:         Mood and Affect: Mood normal.         Behavior: Behavior normal.         Vital Signs:   Vitals:    04/04/25 1744   BP: 125/79   Pulse: 65   Resp: 20   Temp: 97.7 °F (36.5 °C)   SpO2: 100%   Weight: 129 kg (285 lb)   Height: 188 cm (74.02\")     Body mass index is 36.58 kg/m².      Labs:   No visits with results within 1 Month(s) from this visit.   Latest known visit with results is:   Office Visit on 09/26/2024   Component Date Value Ref Range Status    WBC 09/26/2024 8.46  3.40 - 10.80 10*3/mm3 Final    RBC 09/26/2024 5.03  4.14 - 5.80 10*6/mm3 Final    Hemoglobin 09/26/2024 14.7  13.0 - 17.7 g/dL Final    Hematocrit 09/26/2024 44.1  37.5 - 51.0 % Final    MCV 09/26/2024 87.7  79.0 - 97.0 fL Final    MCH 09/26/2024 29.2  26.6 - 33.0 pg Final    MCHC 09/26/2024 33.3  31.5 - 35.7 g/dL Final    RDW 09/26/2024 12.4  12.3 - 15.4 % Final    Platelets 09/26/2024 189  140 - 450 10*3/mm3 Final    Glucose 09/26/2024 95  65 - 99 mg/dL Final    BUN 09/26/2024 8  6 - 20 mg/dL Final    Creatinine 09/26/2024 0.94  0.76 - 1.27 mg/dL Final    EGFR Result 09/26/2024 107.1  >60.0 mL/min/1.73 Final    Comment: GFR Normal >60  Chronic Kidney Disease <60  Kidney Failure <15      BUN/Creatinine Ratio 09/26/2024 8.5  7.0 - 25.0 Final    Sodium 09/26/2024 140  136 - 145 mmol/L Final    Potassium 09/26/2024 5.1  3.5 - 5.2 mmol/L Final    Chloride 09/26/2024 105  98 - 107 mmol/L Final    Total CO2 09/26/2024 26.8  22.0 - 29.0 mmol/L Final    Calcium 09/26/2024 9.0  8.6 - 10.5 mg/dL Final    Total Protein 09/26/2024 6.5  6.0 - 8.5 g/dL Final    Albumin 09/26/2024 4.3  3.5 - 5.2 g/dL Final    Globulin 09/26/2024 2.2  gm/dL Final    A/G Ratio 09/26/2024 2.0  g/dL Final    Total Bilirubin " 09/26/2024 0.3  0.0 - 1.2 mg/dL Final    Alkaline Phosphatase 09/26/2024 62  39 - 117 U/L Final    AST (SGOT) 09/26/2024 20  1 - 40 U/L Final    ALT (SGPT) 09/26/2024 38  1 - 41 U/L Final    TSH 09/26/2024 1.700  0.270 - 4.200 uIU/mL Final        Assessment / Plan      Assessment/Plan:   Diagnoses and all orders for this visit:    1. Primary hypertension (Primary)  -     CBC (No Diff)  -     Comprehensive Metabolic Panel  -     Lipid Panel  -     TSH Rfx On Abnormal To Free T4    2. Gastroesophageal reflux disease  -     H. Pylori Breath Test - Breath, Lung  -     famotidine (Pepcid) 20 MG tablet; Take 1 tablet by mouth 2 (Two) Times a Day.  Dispense: 60 tablet; Refill: 0    3. Obesity (BMI 30.0-34.9)           HTN stable.  Continue medication as prescribed, DASH diet, 150 minutes of cardiovascular exercise weekly, home blood pressure monitoring.  GERD- no PPI 2 weeks, obtain H pylori breath test. Will prescribe famotidine to take BID prn. Anti-reflux measures, trigger food and drinks to avoid; Fatty foods, alcohol, chocolate, coffee, tea, caffeinated soft drinks (decaffeinated coffee still has some caffeine), peppermint and spearmint, spices and vinegar, citrus fruits and juices, tomatoes and tomato sauces, and smoking. Other antireflux measures include weight reduction if overweight, avoid tight clothing around the abdomen, elevate the head of the bed 6 inches (may us a bed wedge which is placed between the mattress and box springs) or blocks under the heard of the bed. Don't drink or eat for 2 hours before going to bed and avoid lying down immediately after meals. Keep appointment with GI and for abdominal US.  Work on diet/exercise for weight loss       Follow Up:   Return in about 30 weeks (around 10/31/2025).      Snow CABELLO  [facility] - [place]

## 2025-05-08 ENCOUNTER — TELEPHONE (OUTPATIENT)
Dept: INTERNAL MEDICINE | Facility: CLINIC | Age: 40
End: 2025-05-08

## 2025-05-08 NOTE — TELEPHONE ENCOUNTER
Caller: Anand Lui    Relationship: Self    Best call back number:  [phone]    What is the medical concern/diagnosis: GALLBLADDER        Any additional details: PATIENT IS CANCELING THE ULTRASOUND APPT  AS HIS GI PHYSICIAN FEELS IT IS NOT NECESSARY AFTER SEEING HIM